# Patient Record
(demographics unavailable — no encounter records)

---

## 2024-10-21 NOTE — NUR
SHIFT SUMMARY-
PT HAS A 1:1 SITTER FOR SI. PT STATES HIS PAIN IS WELL MANAGED AT THIS TIME,
HE HAS PAIN, BUT IT'S "NOTHING I CAN'T HANDLE RIGHT NOW." PT WAS INSTRUCTED TO
CALL FOR ASSISTANCE IF THE PAIN GETS TO A LEVEL WHERE HE FEELS HE NEEDS PAIN
MEDICATION. PROVIDED THE PT WITH A WARM BLANKET, HE IS RECLINED IN HIS BED IN
CHAIR MODE AT THIS TIME AND SEEMS TO BE TOLLERATING THAT BETTER THAN THE
CHAIR.
 
PT WAS SEEN BY DR LEE AND HE ORDERED NEW MEDICATIONS TO START THIS
EVENING. PT DAUGHTER HAS BEEN IN TO SEE HIM, SPOUSE CAME IN WHEN THE DR WAS AT
THE BEDSIDE AND SHE LEFT CLOTHES FOR HIM (PLACED IN THE CHART CUBBORD). PT
STILL HIGH SI, ALTHOUGH HE DENIES ANY THOUGHTS AT THIS TIME.
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
## 2024-10-22 NOTE — NUR
SHIFT SUMMARY
ADMITTED FOR SI. FULL CODE. I DID CALL THE HOSPITALIST THIS SHIFT FOR PO PAIN
RX. IV PAIN RX WAS DC'D. THE PT STATES THAT THE PO PAIN RX HAS BEEN VERY
EFFECTIVE AND HE HAS GOTTEN GOOD SLEEP THIS SHIFT. HE IS CALM AND COOPERATIVE
WITH CARE. SITTER IN ROOM. PSYCH CONSULT IS DR. DECKER. 3 LPM O2 VIA NC.
REGULAR DIET.

## 2024-10-22 NOTE — NUR
SHIFT SUMMARY-
PT HAS HAD NO ACUTE CHANGE T/O THE SHIFT. HE IS ALERT AND ORIENTED, HIS WIFE
CALLED TO CHECK IN ON HIM, THE PT GAVE VERBAL CONSENT FOR STAFF TO TALK TO
HER. PT IS SITTING IN THE RECLINER AT THE TIME OF REPORT. MEDICATED WITH
TRAMADOL AT SHIFT CHANGE, NO S&S OF DISTRESS NOTED 1:1 SITTER PRESENT IN THE
ROOM.

## 2024-10-23 NOTE — NUR
Pt alert and oriented times four. Got angry earlier in the day and became
distrustful of wife, did not want us to share information with her today. Two
of his daughters visited today and spent time with pt, pt appears more calm
with visits. Sitter remains in room. Pain controlled with PRN tramadol x 1.

## 2024-10-23 NOTE — NUR
PT HAS BECOME AGGITATED AFTER DR GOOD CAME TO SEE HIM-
PT DECIDED AFTER DR GOOD TALKED TO HIM ABOUT INPATIENT PSYCH FACILITY AT [place],
THAT HE NEEDED TO GET A . CALLED CHARGE RN GERTRUDE WHO CONFIRMED THE PT
IS NOT IN A SITUATION WHERE A  IS AVAILABLE TO HIM; HOWEVER HE
WAS PROVIDED A PHONE AND OFFERED A PHONE BOOK TO OBTAIN LEGAL [place] IF HE
WOULD LIKE. HE DECLINES THIS AT THIS TIME SAYING HE DOES NOT HAVE MONEY FOR A
 "JUST FORGET IT." HE ATTEMPTED TO CALL BOTH OF HIS DAUGHTERS AND HIS
WIFE, AND WAS UNABLE TO REACH THEM. NOW HE IS AGGITATED SAYING HIS WIFE HAS
HIS BANK CARDS AND "HAS PROBABLY ALREADY DRAINED THE ACCOUNTS." YESTERDAY HE
VERBALLY TOLD THIS RN IS WAS OK TO TALK WITH HER. TODAY HE HAS DECIDED SHE CAN
NOT HAVE ANY INFORMATION.

## 2024-10-23 NOTE — NUR
SHIFT SUMMARY
ADMITTED FOR SI. FULL CODE. HIGH SI PRECAUTIONS. PLAN IS FOR INPATIENT PSYCH
PLACEMENT. REGULAR DIET. STANDBY ASSIST - BRP. DR. LEE IS PSYCH
CONSULT. A&O X4. ON 3 1/2 LPM O2, ON 2-4 LPM O2 @ BASELINE. HX: COPD, PTSD.
MEDICATED FOR PAIN THIS SHIFT. ULTRAM PO CONTINUES TO BE EFFECTIVE. HE IS
PLEASANT AND COOPERATIVE WITH CARE.

## 2024-10-24 NOTE — NUR
SHIFT SUMMARY
 
 PT IS ALERT AND ORIENTED TIMES 4. PT IS POLITE AND RECEPTIVE TO CARE AND
SOCIAL WITH STAFF WHEN DOING ROUNDS. PT IS ABLE TO MAKE NEEDS KNOWN AND ABLE
TO AMBULATE FROM BED TO RECLINER, AND TO THE RESTROOM.  PT WAS HS MED
COMPLIANT. PT SPENT A MAJORITY OF TIME IN RECLINER, WATCHING TV. BED IN LOW
POSITION, CALL LIGHT WITHIN REACH, RAILS TIMES 2.

## 2024-10-24 NOTE — NUR
SHIFT SUMMARY:
PT A/O X4. PLEASANT AND COOPERATIVE WITH CARE. C/O PAIN ONCE THIS SHIFT IN
LOWER BACK. MEDICATED WITH PRN TRAMADOL ONCE WITH AM MEDICATIONS. PT DAUGHTER
IN ROOM T/O SHIFT. DR. LEE IN TO SEE PT THIS SHIFT. INCREASED NIGHT
TIME SEROQUEL. CALL LIGHT IN REACH.

## 2024-10-25 NOTE — NUR
DISCHARGE SUMMARY:
PATIENT DISCHARGE HOME. DISCHARGE INSTRUCTIONS PACKET GIVEN TO PATIENT.
EDUCATED PATIENT REGARDING ADMITTING DX'S OF SI, S/S, TX, SELF CARE, NEW RX
AND TO F/U c PCP. PATIENT VERBALIZED UNDERSTANDING AND NO FURTHER QUESTIONS.
RX WAS FAXED TO PATIENT PREFERRED PHARMACY ([facility]). ALL PATIENT
PERSONAL BELONGINGS WERE SENT HOME c THE PATIENT. PATIENT LEFT THE ROOM AT
1736, TRANSPORTED VIA WHEELCHAIR BY REHANA WHALEN TO PATIENT ENTRANCE.

## 2024-10-25 NOTE — NUR
NOTE:
PATIENT A/OX4, CALM, PLEASANT AND COOPERATIVE c CARE. PATIENT DENIES SI OR
HARMING HIMSELF OR OTHERS. DR. COMBS CAME IN AND SPOKE TO PATIENT,
SUICIDE PRECAUTIONS DC'D. 1:1 SITTER DC'D. DISCHARGE PLANNER, AUNG LOGNA SPOKE
TO PATIENT, PLAN TO DISCHARGE HOME THIS PM. PER DISCHARGE PLANNER, AUNG LOGAN
DAUGHTER WILL BE BRINGING PORTABLE HOME O2 THIS PM.

## 2024-10-25 NOTE — NUR
SHIFT SUMMARY:
PATIENT A/OX4, PLEASANT AND COOPERATIVE c CARE. PATIENT DENIES SI OR HARMING
HIMSELF/OTHERS. SUICIDE PRECAUTION, 1:1 SITTER WAS DC'D AT 1326 PER ORDER.
PATIENT REPORTS PAIN TO HIP, MEDICATED FOR PAIN PER EMAR c GOOD EFFECT.
PATIENT RECEIVED SCHEDULED MEDS PER EMAR. PATIENT HAS GREAT APPETITE,
CONTINENT OF BLADDER, AMBULATES TO BATHROOM INDEPENDENTLY. VITAL SIGNS
REVIEWED. PATIENT UP IN THE CHAIR ON/OFF T/O THE DAY.

## 2024-10-25 NOTE — NUR
SHIFT SUMMARY
 
 PT IS ALERT AND ORIENTED TIMES 4. PT IS POLITE AND RECEPTIVE TO CARE AND
SOCIAL WITH STAFF WHEN DOING ROUNDS. PT WAS VISITED BY DAUGHTER AND PLAYED
CHESS. PT APPEARS IN VERY GOOD SPIRITS TODAY.  PT IS ABLE TO MAKE NEEDS KNOWN
AND ABLE TO AMBULATE FROM BED TO RECLINER, AND TO THE RESTROOM.  PT WAS HS MED
COMPLIANT. PT SPENT A MAJORITY OF TIME IN RECLINER, WATCHING TV. BED IN LOW
POSITION, CALL LIGHT WITHIN REACH, RAILS TIMES 2.